# Patient Record
Sex: MALE | Race: WHITE | NOT HISPANIC OR LATINO | URBAN - METROPOLITAN AREA
[De-identification: names, ages, dates, MRNs, and addresses within clinical notes are randomized per-mention and may not be internally consistent; named-entity substitution may affect disease eponyms.]

---
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
## 2021-05-13 NOTE — PROGRESS NOTES
Progress Note      Patient Name: Kip Ortega   Patient ID: [id number]   Sex: Male   YOB: 2000    Primary Care Provider: Carmen Boggs PA-C    Visit Date: August 5, 2020    Provider: Carmen Boggs PA-C   Location: [facility]   Location Address: [address] [place] [address], [address]  [place], [place]  [id number]   Location Phone: [phone]          Chief Complaint  · elevated BP   · Palpitations      History Of Present Illness  Kip Ortega is a 20 year old /White male who presents for evaluation and treatment of:      f/u     Elevated BP- has been eating a lot of salt in diet. Does not want to start medications    Palpitations- wearing holter monitor now.   He had one episode of palpitations a few days ago. He said he was carrying a friend up a hill on his face and felt them. Sx lasted a few min then resolved on own. Denies dizziness, syncope, loc, cp.      Neck swelling- L side of neck swelling comes and goes. It is not there now  Denies difficulty breathing, swelling of lips/tongue/throat, fever, sore throat, cough  He does have allergies.       Past Medical History  Disease Name Date Onset Notes   Erectile dysfunction --  --    Palpitations --  --          Past Surgical History  Procedure Name Date Notes   Ear Tubes --  --          Allergy List  Allergen Name Date Reaction Notes   NO KNOWN DRUG ALLERGIES --  --  --        Allergies Reconciled  Family Medical History  Disease Name Relative/Age Notes   Hypertension Father/   --          Social History  Finding Status Start/Stop Quantity Notes   Tobacco Never --/-- --  --          Review of Systems  · Constitutional  o Denies  o : fever, fatigue, weight loss, weight gain  · HENT  o Denies  o : ear pain, sore throat  · Cardiovascular  o Admits  o : palpitations  o Denies  o : lower extremity edema, claudication, chest pressure  · Respiratory  o Denies  o : shortness of breath, wheezing, frequent cough, hemoptysis, dyspnea on  "exertion  · Gastrointestinal  o Denies  o : nausea, vomiting, diarrhea, constipation, abdominal pain      Vitals  Date Time BP Position Site L\R Cuff Size HR RR TEMP (F) WT  HT  BMI kg/m2 BSA m2 O2 Sat HC       07/29/2020 02:43 /68 Sitting    63 - R 15 98 258lbs 0oz 6'  3\" 32.25 2.49 96 %    08/05/2020 03:43 /64 Sitting    57 - R 15 97.8 251lbs 16oz 6'  3\" 31.5 2.46 97 %          Physical Examination  · Constitutional  o Appearance  o : no acute distress, well-nourished  · Head and Face  o Head  o :   § Inspection  § : atraumatic, normocephalic  · Eyes  o Eyes  o : extraocular movements intact, no scleral icterus, no conjunctival injection  · Ears, Nose, Mouth and Throat  o Ears  o :   § External Ears  § : normal  § Otoscopic Examination  § : tympanic membrane appearance within normal limits bilaterally  o Nose  o :   § Intranasal Exam  § : nares patent  o Oral Cavity  o :   § Oral Mucosa  § : moist mucous membranes  o Throat  o :   § Oropharynx  § : no inflammation or lesions present, tonsils within normal limits  · Neck  o Thyroid  o : gland size normal, nontender, no nodules or masses present on palpation, symmetric  · Respiratory  o Respiratory Effort  o : breathing comfortably, symmetric chest rise  o Auscultation of Lungs  o : clear to asculatation bilaterally, no wheezes, rales, or rhonchii  · Cardiovascular  o Heart  o :   § Auscultation of Heart  § : regular rate and rhythm, no murmurs, rubs, or gallops  o Peripheral Vascular System  o :   § Extremities  § : no edema  · Lymphatic  o Neck  o : no lymphadenopathy present  o Supraclavicular Nodes  o : no supraclavicular nodes  · Skin and Subcutaneous Tissue  o General Inspection  o : no lesions present, no areas of discoloration, skin turgor normal  · Neurologic  o Mental Status Examination  o :   § Orientation  § : grossly oriented to person, place and time  o Gait and Station  o :   § Gait Screening  § : normal gait  · Psychiatric  o General  o : " normal mood and affect          Assessment  · Elevated BP without diagnosis of hypertension     796.2/R03.0  Discussed bp elevation again in clinic. Pt declines medication. Cont low salt diet, increase physical activity. Pt will follow up with cardiology for palpitations and holter results.  · Palpitations     785.1/R00.2  reviewed normal labs. Cont to avoid caffeine. Discussed stress management and sleep hygiene. Discussed red flag sx and when to go to er. Pt understand and agrees  · Neck swelling     784.2/R22.1  PE WNL today. Pt states it is not swollen at this time. Discussed ddx, pt will call back if swelling returns and we will get us to eval. discussed labs wnl which is reassuring.      Plan  · Orders  o ACO-39: Current medications updated and reviewed () - - 08/05/2020  o CARDIOLOGY CONSULTATION (CARDI) - 796.2/R03.0, 785.1/R00.2 - 08/05/2020  · Medications  o Medications have been Reconciled  o Transition of Care or Provider Policy  · Instructions  o Patient was educated/instructed on their diagnosis, treatment and medications prior to discharge from the clinic today.  · Disposition  o Call or Return if symptoms worsen or persist.  o Follow up in 1 month            Electronically Signed by: Carmen Boggs PA-C -Author on August 6, 2020 08:17:08 AM

## 2021-05-15 VITALS
HEART RATE: 57 BPM | DIASTOLIC BLOOD PRESSURE: 64 MMHG | SYSTOLIC BLOOD PRESSURE: 130 MMHG | TEMPERATURE: 97.8 F | OXYGEN SATURATION: 97 % | HEIGHT: 75 IN | WEIGHT: 252 LBS | RESPIRATION RATE: 15 BRPM | BODY MASS INDEX: 31.33 KG/M2

## 2021-05-15 VITALS
DIASTOLIC BLOOD PRESSURE: 68 MMHG | TEMPERATURE: 98 F | SYSTOLIC BLOOD PRESSURE: 130 MMHG | OXYGEN SATURATION: 96 % | WEIGHT: 258 LBS | HEIGHT: 75 IN | DIASTOLIC BLOOD PRESSURE: 70 MMHG | RESPIRATION RATE: 15 BRPM | HEART RATE: 63 BPM | BODY MASS INDEX: 32.08 KG/M2 | SYSTOLIC BLOOD PRESSURE: 136 MMHG | HEART RATE: 82 BPM

## 2021-05-16 VITALS — BODY MASS INDEX: 33.01 KG/M2 | HEIGHT: 75 IN | HEART RATE: 81 BPM | WEIGHT: 265.5 LBS | OXYGEN SATURATION: 97 %

## 2021-05-16 VITALS — OXYGEN SATURATION: 98 % | BODY MASS INDEX: 32.95 KG/M2 | WEIGHT: 265 LBS | HEIGHT: 75 IN | HEART RATE: 78 BPM

## 2021-05-16 VITALS — HEART RATE: 73 BPM | OXYGEN SATURATION: 97 % | HEIGHT: 75 IN | BODY MASS INDEX: 31.08 KG/M2 | WEIGHT: 250 LBS

## 2021-05-16 VITALS — BODY MASS INDEX: 31.08 KG/M2 | OXYGEN SATURATION: 98 % | HEIGHT: 75 IN | HEART RATE: 71 BPM | WEIGHT: 250 LBS

## 2021-05-16 VITALS — HEART RATE: 82 BPM | OXYGEN SATURATION: 97 % | HEIGHT: 75 IN
